# Patient Record
Sex: MALE | URBAN - NONMETROPOLITAN AREA
[De-identification: names, ages, dates, MRNs, and addresses within clinical notes are randomized per-mention and may not be internally consistent; named-entity substitution may affect disease eponyms.]

---

## 2023-05-04 ENCOUNTER — APPOINTMENT (RX ONLY)
Dept: URBAN - NONMETROPOLITAN AREA CLINIC 13 | Facility: CLINIC | Age: 28
Setting detail: DERMATOLOGY
End: 2023-05-04

## 2023-05-04 VITALS — HEIGHT: 72 IN | WEIGHT: 210 LBS

## 2023-05-04 DIAGNOSIS — L64.8 OTHER ANDROGENIC ALOPECIA: ICD-10-CM

## 2023-05-04 PROBLEM — D23.5 OTHER BENIGN NEOPLASM OF SKIN OF TRUNK: Status: ACTIVE | Noted: 2023-05-04

## 2023-05-04 PROCEDURE — ? INTRALESIONAL KENALOG

## 2023-05-04 PROCEDURE — ? TREATMENT REGIMEN

## 2023-05-04 PROCEDURE — 99202 OFFICE O/P NEW SF 15 MIN: CPT | Mod: 25

## 2023-05-04 PROCEDURE — ? COUNSELING

## 2023-05-04 PROCEDURE — ? PRESCRIPTION

## 2023-05-04 PROCEDURE — 11900 INJECT SKIN LESIONS </W 7: CPT

## 2023-05-04 RX ORDER — FINASTERIDE 1 MG/1
1 MG TABLET, FILM COATED ORAL QD
Qty: 90 | Refills: 2 | Status: ERX | COMMUNITY
Start: 2023-05-04

## 2023-05-04 RX ADMIN — FINASTERIDE 1 MG: 1 TABLET, FILM COATED ORAL at 00:00

## 2023-05-04 ASSESSMENT — LOCATION SIMPLE DESCRIPTION DERM: LOCATION SIMPLE: RIGHT FOREHEAD

## 2023-05-04 ASSESSMENT — LOCATION ZONE DERM: LOCATION ZONE: FACE

## 2023-05-04 ASSESSMENT — LOCATION DETAILED DESCRIPTION DERM: LOCATION DETAILED: RIGHT FOREHEAD

## 2023-05-04 NOTE — PROCEDURE: INTRALESIONAL KENALOG
How Many Mls Were Removed From The 80 Mg/Ml (5ml) Vial When Preparing The Injectable Solution?: 0
Consent: The risks of atrophy were reviewed with the patient.
Expiration Date For Kenalog (Optional): 6/2024
Concentration Of Kenalog Solution Injected (Mg/Ml): 40.0
Total Volume (Ccs): .3
Kenalog Preparation: Kenalog
Administered By (Optional): Jacklyn Hinson
Lot # For Kenalog (Optional): [id number]
Medical Necessity Clause: This procedure was medically necessary because the lesions that were treated were:
Validate Note Data When Using Inventory: Yes
X Size Of Lesion In Cm (Optional): 0.9
Ndc# For Kenalog Only: 6911-4792-44
Bill For Wasted Drug (Kenalog)?: no
Detail Level: Detailed

## 2023-05-04 NOTE — PROCEDURE: TREATMENT REGIMEN
Detail Level: Zone
Plan to initiate Finasteride today. Reviewed medication in detail and risks. Plan for follow up in 6 months.